# Patient Record
Sex: FEMALE | Race: WHITE
[De-identification: names, ages, dates, MRNs, and addresses within clinical notes are randomized per-mention and may not be internally consistent; named-entity substitution may affect disease eponyms.]

---

## 2018-10-12 ENCOUNTER — HOSPITAL ENCOUNTER (EMERGENCY)
Dept: HOSPITAL 39 - ER | Age: 58
Discharge: HOME | End: 2018-10-12
Payer: COMMERCIAL

## 2018-10-12 VITALS — TEMPERATURE: 98.8 F | DIASTOLIC BLOOD PRESSURE: 82 MMHG | OXYGEN SATURATION: 100 % | SYSTOLIC BLOOD PRESSURE: 153 MMHG

## 2018-10-12 DIAGNOSIS — T18.108A: Primary | ICD-10-CM

## 2018-10-12 DIAGNOSIS — R11.2: ICD-10-CM

## 2018-10-12 DIAGNOSIS — F32.9: ICD-10-CM

## 2018-10-12 NOTE — ED.PDOC
History of Present Illness





- General


Chief Complaint: Respiratory Problem


Stated Complaint: Feels throat closing


Time Seen by Provider: 10/12/18 20:34


Source: patient


Exam Limitations: other - limited the patient is a poor historian. 





- History of Present Illness


Comments: 





THIS PATIENT COMES TO THE ED WITH DROOLING, WAS EATING SOME CHICKEN AND CHOKED.

  NOW COUGHING AND DROOLING. THE PATIENT IS A POOR HISTORIAN AND SEEMS VERY 

ANXIOUS.  SHE STATED THAT IT HAS NOTHING TO DO WITH THE FOOD AND THAT SHE HAS 

ACUTE SWELLING OF THE ESOPHAGUS. ACCORDING TO THE PATIENT STEROIDS SEEM TO HELP 

HER WHEN THIS HAS OCCURRED.  SHE GIVES LITTLE INFORMATION, VOICES THAT LAST 

TIME SHE HAD THIS SHE WAITED 90 MINUTES HERE AND THEN HAD TO BE SEEN AT A CARE 

NOW IN [place], [place].  THERE SHE RECEIVED TWO SHOTS AND GOT BETTER.  WILL NOT 

SAY IF SHE HAS EVER BEEN SCOPED AND IF SHE HAS EVER SEEN A GASTROENTEROLOGIST. 

THE PATIENT IS ACTING AS IF SHE HAS AN ESOPHAGEAL FB. SHE IS VERY ANXIOUS. 


Timing/Duration: just prior to arrival


Cough Quality/Degree: dry cough


Possible Cause: occasional episodes


Improving Factors: nothing


Worsening Factors: nothing


Associated Symptoms: denies symptoms


Allergies/Adverse Reactions: 


Allergies





NO KNOWN ALLERGY Allergy (Verified 02/14/15 21:18)


 








Home Medications: 


Ambulatory Orders





Lansoprazole [Prevacid] 30 mg PO BEDTIME #30 cap 10/12/18 











Review of Systems





- Review of Systems


Constitutional: States: no symptoms reported


EENTM: States: no symptoms reported


Respiratory: States: no symptoms reported


Cardiology: States: no symptoms reported


Gastrointestinal/Abdominal: States: nausea, vomiting, other - DROOLING


Musculoskeletal: States: no symptoms reported


Skin: States: no symptoms reported


Neurological: States: no symptoms reported


Endocrine: States: no symptoms reported


Hematologic/Lymphatic: States: no symptoms reported


All other Systems: Reviewed and Negative





Past Medical History (General)





- Patient Medical History


Hx Seizures: No


Hx Stroke: No


Hx Dementia: No


Hx Asthma: No


Hx of COPD: No


Hx Cardiac Disorders: No


Hx Congestive Heart Failure: No





- Vaccination History


Hx Tetanus, Diphtheria Vaccination: No


Hx Influenza Vaccination: No


Hx Pneumococcal Vaccination: No





- Social History


Hx Tobacco Use: No


Hx Alcohol Use: No


Hx Substance Use: No


Hx Substance Use Treatment: No


Hx Depression: Yes





- Female History


Patient Pregnant: No





Family Medical History





- Family History


  ** Mother


Family History: Unknown





Physical Exam





- Physical Exam


General Appearance: Alert, Anxious, Well Developed, Well Hydrated


Eye Exam: bilateral normal


ENT Exam: normal ENT inspection


Respiratory: chest non-tender, lungs clear, normal breath sounds, no 

respiratory distress, no accessory muscle use


Cardiovascular/Chest: normal peripheral pulses, regular rate, rhythm, no edema, 

no gallop, no JVD, no murmur


Gastrointestinal/Abdominal: non tender, soft, no organomegaly


Extremity: normal range of motion, non-tender, normal inspection


Neurologic: no motor/sensory deficits, alert, oriented x 3


Skin Exam: normal color, warm/dry


Lymphatic: no adenopathy





Progress





- Results/Orders


Results/Orders: 





MUCH IMPROVED AFTER GLUCAGON. 





SHE IS LESS ANXIOUS SND NOW VOICES [facility]  

SGE WAS SCOPED AND DILATED.  SO EVIDENTLY SHE HAS HAD A STRICTURE.  





Departure





- Departure


Clinical Impression: 


Esophagus, foreign body


Qualifiers:


 Encounter type: initial encounter Qualified Code(s): T18.108A - Unspecified 

foreign body in esophagus causing other injury, initial encounter





Time of Disposition: 21:10


Disposition: Discharge to Home or Self Care


Condition: Good


Departure Forms:  ED Discharge - Pt. Copy, Patient Portal Self Enrollment


Instructions:  Foreign Body, Swallowed, Adult (DC)


Prescriptions: 


Lansoprazole [Prevacid] 30 mg PO BEDTIME #30 cap


Home Medications: 


Ambulatory Orders





Lansoprazole [Prevacid] 30 mg PO BEDTIME #30 cap 10/12/18